# Patient Record
Sex: FEMALE | Employment: UNEMPLOYED | URBAN - METROPOLITAN AREA
[De-identification: names, ages, dates, MRNs, and addresses within clinical notes are randomized per-mention and may not be internally consistent; named-entity substitution may affect disease eponyms.]

---

## 2018-11-29 ENCOUNTER — OFFICE VISIT (OUTPATIENT)
Dept: URGENT CARE | Facility: URGENT CARE | Age: 33
End: 2018-11-29

## 2018-11-29 VITALS
BODY MASS INDEX: 31.55 KG/M2 | HEART RATE: 84 BPM | DIASTOLIC BLOOD PRESSURE: 70 MMHG | SYSTOLIC BLOOD PRESSURE: 122 MMHG | WEIGHT: 232.6 LBS | TEMPERATURE: 98.5 F | OXYGEN SATURATION: 99 %

## 2018-11-29 DIAGNOSIS — R30.0 DYSURIA: Primary | ICD-10-CM

## 2018-11-29 LAB
ALBUMIN UR-MCNC: NEGATIVE MG/DL
APPEARANCE UR: CLEAR
BILIRUB UR QL STRIP: NEGATIVE
COLOR UR AUTO: YELLOW
GLUCOSE UR STRIP-MCNC: NEGATIVE MG/DL
HGB UR QL STRIP: NEGATIVE
KETONES UR STRIP-MCNC: NEGATIVE MG/DL
LEUKOCYTE ESTERASE UR QL STRIP: NEGATIVE
NITRATE UR QL: NEGATIVE
PH UR STRIP: 5.5 PH (ref 5–7)
SOURCE: NORMAL
SP GR UR STRIP: 1.02 (ref 1–1.03)
UROBILINOGEN UR STRIP-ACNC: 0.2 EU/DL (ref 0.2–1)

## 2018-11-29 PROCEDURE — 99203 OFFICE O/P NEW LOW 30 MIN: CPT | Performed by: FAMILY MEDICINE

## 2018-11-29 PROCEDURE — 87086 URINE CULTURE/COLONY COUNT: CPT | Performed by: FAMILY MEDICINE

## 2018-11-29 PROCEDURE — 81003 URINALYSIS AUTO W/O SCOPE: CPT | Performed by: FAMILY MEDICINE

## 2018-11-29 RX ORDER — NITROFURANTOIN 25; 75 MG/1; MG/1
100 CAPSULE ORAL 2 TIMES DAILY
Qty: 14 CAPSULE | Refills: 0 | Status: SHIPPED | OUTPATIENT
Start: 2018-11-29

## 2018-11-29 NOTE — LETTER
December 1, 2018      Vani F Marlo  [address] [place] [address]        Dear MsKristin,    We are writing to inform you of your test results.    Your urine culture grew mixed bacteria. This may not be diagnostic for a true infection. Please follow up in clinic or with your primary care provider if your symptoms persist. Please come in right away if with worsening symptoms    If you have any questions or concerns, please call the clinic at the number listed above.       Sincerely,        Stephani Suero MD/MELCHOR CMA

## 2018-11-29 NOTE — MR AVS SNAPSHOT
"              After Visit Summary   2018    Vani F Marlo    MRN: [id number]           Patient Information     Date Of Birth          1985        Visit Information        Provider Department      2018 6:25 PM Stephani Suero MD [facility]        Today's Diagnoses     Dysuria    -  1       Follow-ups after your visit        Who to contact     If you have questions or need follow up information about today's clinic visit or your schedule please contact [facility] directly at [phone].  Normal or non-critical lab and imaging results will be communicated to you by MyChart, letter or phone within 4 business days after the clinic has received the results. If you do not hear from us within 7 days, please contact the clinic through [employer] or phone. If you have a critical or abnormal lab result, we will notify you by phone as soon as possible.  Submit refill requests through [employer] or call your pharmacy and they will forward the refill request to us. Please allow 3 business days for your refill to be completed.          Additional Information About Your Visit        MyChart Information     [employer] lets you send messages to your doctor, view your test results, renew your prescriptions, schedule appointments and more. To sign up, go to [email or web address] . Click on \"Log in\" on the left side of the screen, which will take you to the Welcome page. Then click on \"Sign up Now\" on the right side of the page.     You will be asked to enter the access code listed below, as well as some personal information. Please follow the directions to create your username and password.     Your access code is: [email or web address]  Expires: 2019 10:56 PM     Your access code will  in 90 days. If you need help or a new code, please call your [facility] or [phone].        Care EveryWhere ID     This is your Care EveryWhere ID. This could be used by other organizations to " access your [facility] medical records  [id number]        Your Vitals Were     Pulse Temperature Pulse Oximetry Breastfeeding? BMI (Body Mass Index)       84 98.5  F (36.9  C) (Tympanic) 99% Unknown 31.55 kg/m2        Blood Pressure from Last 3 Encounters:   11/29/18 122/70   08/11/13 111/57   03/09/12 115/72    Weight from Last 3 Encounters:   11/29/18 232 lb 9.6 oz (105.5 kg)   03/09/12 226 lb (102.5 kg)   02/27/12 216 lb (98 kg)              We Performed the Following     UA reflex to Microscopic and Culture     Urine Culture Aerobic Bacterial          Today's Medication Changes          These changes are accurate as of 11/29/18 10:56 PM.  If you have any questions, ask your nurse or doctor.               Start taking these medicines.        Dose/Directions    nitroFURantoin macrocrystal-monohydrate 100 MG capsule   Commonly known as:  MACROBID   Used for:  Dysuria   Started by:  Stephani Suero MD        Dose:  100 mg   Take 1 capsule (100 mg) by mouth 2 times daily   Quantity:  14 capsule   Refills:  0            Where to get your medicines      [facility] [address] [place] [address] AT [facility] & [address] [address]     Phone:  [phone]     nitroFURantoin macrocrystal-monohydrate 100 MG capsule                Primary Care Provider Office Phone # Fax #    [facility] [phone]       [address] [place] [id number]        Equal Access to Services     CHELSEA WOLFE AH: Hadii matthew londono Sochance, waaxda luqadaha, qaybta kaalmada adebhupendra, waxay idiin hayaan adekaren aguilera. So [facility] [phone].    ATENCIÓN: Si habla español, tiene a garcia disposición servicios gratuitos de asistencia lingüística. Llame al [phone].    We comply with applicable federal civil rights laws and [place] laws. We do not discriminate on the basis of race, color,  national origin, age, disability, sex, sexual orientation, or gender identity.            Thank you!     Thank you for choosing [facility]  [facility]. Our goal is always to provide you with excellent care. Hearing back from our patients is one way we can continue to improve our services. Please take a few minutes to complete the written survey that you may receive in the mail after your visit with us. Thank you!             Your Updated Medication List - Protect others around you: Learn how to safely use, store and throw away your medicines at www.disposemymeds.org.          This list is accurate as of 11/29/18 10:56 PM.  Always use your most recent med list.                   Brand Name Dispense Instructions for use Diagnosis    DOCUSATE SODIUM PO           nitroFURantoin macrocrystal-monohydrate 100 MG capsule    MACROBID    14 capsule    Take 1 capsule (100 mg) by mouth 2 times daily    Dysuria       prenatal multivitamin w/iron 27-0.8 MG tablet      Take 1 tablet by mouth daily.        TYLENOL PO

## 2018-11-30 LAB
BACTERIA SPEC CULT: NORMAL
BACTERIA SPEC CULT: NORMAL
SPECIMEN SOURCE: NORMAL

## 2018-11-30 NOTE — PROGRESS NOTES
Dysuria    Dysuria/ugency/frequency 7 days  Initially just some frequency   Patient tried drinking increased fluids and got better  Past few days got worse again   Vaginal discharge or concerns: No  Fever chills: None  Nausea vomiting: None  Flank Pain: None  Patient denies possibility of pregnancy  Denies any possibility of pregnancy declined a pregnancy test  No thoughts of harming self or others   Denies possibility of STD, declined STD testing.    Problem list and histories reviewed & adjusted, as indicated.  Additional history: as documented    Problem list, Medication list, Allergies, and Medical/Social/Surgical histories reviewed in [facility] and updated as appropriate.    ROS:  Constitutional, HEENT, cardiovascular, pulmonary, gi and gu systems are negative, except as otherwise noted.    OBJECTIVE:                                                    /70  Pulse 84  Temp 98.5  F (36.9  C) (Tympanic)  Wt 232 lb 9.6 oz (105.5 kg)  SpO2 99%  BMI 31.55 kg/m2  Body mass index is 31.55 kg/(m^2).  GENERAL: healthy, alert and no distress  NECK: no adenopathy, no asymmetry, masses, or scars and thyroid normal to palpation  RESP: lungs clear to auscultation - no rales, rhonchi or wheezes  CV: regular rate and rhythm, normal S1 S2, no S3 or S4, no murmur, click or rub, no peripheral edema and peripheral pulses strong  ABDOMEN: soft, nontender, no hepatosplenomegaly, no masses and bowel sounds normal  MS: no gross musculoskeletal defects noted, no edema    Diagnostic Test Results:  Results for orders placed or performed in visit on 11/29/18 (from the past 24 hour(s))   UA reflex to Microscopic and Culture   Result Value Ref Range    Color Urine Yellow     Appearance Urine Clear     Glucose Urine Negative NEG^Negative mg/dL    Bilirubin Urine Negative NEG^Negative    Ketones Urine Negative NEG^Negative mg/dL    Specific Gravity Urine 1.025 1.003 - 1.035    Blood Urine Negative NEG^Negative    pH Urine 5.5 5.0 - 7.0 pH     Protein Albumin Urine Negative NEG^Negative mg/dL    Urobilinogen Urine 0.2 0.2 - 1.0 EU/dL    Nitrite Urine Negative NEG^Negative    Leukocyte Esterase Urine Negative NEG^Negative    Source Midstream Urine         ASSESSMENT/PLAN:                                                        ICD-10-CM    1. Dysuria R30.0 UA reflex to Microscopic and Culture     Urine Culture Aerobic Bacterial     nitroFURantoin macrocrystal-monohydrate (MACROBID) 100 MG capsule     Urinalysis normal however patient would like empiric treatment  Pending culture  She states she is very certain this is a uti.  She delcines a pelvic exam or any additional workup  Alarm signs or symptoms discussed, if present recommend go to ER   Prescribed with macrobid     Aware to go to ER or come in immediately if with any fever chills nausea vomiting or flank pain.  Adverse reactions of medications discussed.  Over the counter medications discussed.   Aware to come back in if with worsening symptoms or if no relief despite treatment plan  Patient voiced understanding and had no further questions.     MD Stephani Layne MD  [facility]

## 2021-02-26 ENCOUNTER — HOSPITAL ENCOUNTER (EMERGENCY)
Facility: CLINIC | Age: 36
Discharge: LEFT BEFORE DISCHARGE | End: 2021-02-27

## 2021-02-26 ENCOUNTER — HOSPITAL ENCOUNTER (EMERGENCY)
Facility: CLINIC | Age: 36
End: 2021-02-26

## 2021-02-27 NOTE — ED PROVIDER NOTES
"    [facility] EMERGENCY DEPARTMENT ([facility])     2021  History   No chief complaint on file.    HPI  Vani Sellers is a  (15w pregnant) 36 year old female w/ PMH of miscarriage, hydrops fetalis, heterotaxy syndrome, vitamin D deficiency, complex congenital heart disease, and Rh+ presenting w/ vaginal bleeding, congestion, and headache.***    Patient was recently seen at [facility] ED on 2021 with an intractable headache and vaginal spotting, and diagnosed with a threatened miscarriage. She had an ultrasound which showed a live pregnancy at 13 weeks. She was directed to take 10 mg Reglan as needed for headache and nausea.    Exam: US OB Limited ANY TRI , [employer], 2021  INDICATION: Pregnant, vaginal bleeding  COMPARISON: None  FINDINGS: Clinical Age: 13 weeks 3 days (JEANNETTE 2021) based on LMP.  IMPRESSION:  1. Single live intrauterine gestation with a clinical age of 13 weeks 3 days.   2. No perigestational bleed. Subjectively normal amniotic fluid.    I have reviewed the Medications, Allergies, Past Medical and Surgical History, and Social History in the [employer] system.  PAST MEDICAL HISTORY:   Past Medical History:   Diagnosis Date     Miscarriage     x2        PAST SURGICAL HISTORY: No past surgical history on file.    Past medical history, past surgical history, medications, and allergies were reviewed with the patient. Additional pertinent items: {NONE:352451::\"None\"}    FAMILY HISTORY: No family history on file.    SOCIAL HISTORY:   Social History     Tobacco Use     Smoking status: Never Smoker   Substance Use Topics     Alcohol use: No     Social history was reviewed with the patient. Additional pertinent items: {NONE:490863::\"None\"}    Review of Systems  10 point review of symptoms was performed and is negative except as noted above.     Physical Exam          Physical Exam  GEN: Well appearing, non toxic, cooperative and conversant.   HEENT: The head is " "normocephalic and atraumatic. Pupils are equal round and reactive to light. Extraocular motions are intact. There is no facial swelling. The neck is nontender and supple.   CV: Regular rate and rhythm without murmurs rubs or gallops. 2+ radial pulses bilaterally.  PULM: Clear to auscultation bilaterally.  ABD: Soft, nontender, nondistended.   EXT: Full range of motion.  No edema.  NEURO: Cranial nerves II through XII are intact and symmetric. Bilateral upper and lower extremities grossly show full range of motion without any focal deficits.   SKIN: No rashes, ecchymosis, or lacerations  PSYCH: Calm and cooperative, interactive.    ED Course        Procedures        {EKG done?:274951::\" \"}    {ed addendum:735888::\" \"}***  {trauma activation or Fall?:064896::\" \"}  {Sepsis/Septic Shock/Stemi/Stroke:867451::\" \"}       No results found for this or any previous visit (from the past 24 hour(s)).  Medications - No data to display          Assessments & Plan (with Medical Decision Making)   ***    I have reviewed the nursing notes.    I have reviewed the findings, diagnosis, plan and need for follow up with the patient.    New Prescriptions    No medications on file       Final diagnoses:   None       2/26/2021   [facility] EMERGENCY DEPARTMENT  "

## 2021-02-27 NOTE — ED TRIAGE NOTES
Patient states she is 15 weeks pregnant and has known vaginal bleeding. Has been seen for this in the past and she states her MD is not concerned. She states she has an appointment Monday. Patient states she is here due to congestion, sore throat and HA x 1 day. Has taken Tylenol without relief. Patient concerned for COVID. Patient then begins to ask about the price of parking in the [place]. Writer explained that it was not free, but did not know the rate. Patient states she does not have money and that it is free at [facility]. Writer offered patient treatment and assessment multiple times, and patient states that she will just go to [facility] where parking is free. Patient signed declination.